# Patient Record
Sex: MALE | Race: WHITE | NOT HISPANIC OR LATINO | Employment: FULL TIME | URBAN - METROPOLITAN AREA
[De-identification: names, ages, dates, MRNs, and addresses within clinical notes are randomized per-mention and may not be internally consistent; named-entity substitution may affect disease eponyms.]

---

## 2022-09-06 ENCOUNTER — HOSPITAL ENCOUNTER (EMERGENCY)
Facility: CLINIC | Age: 47
Discharge: HOME | End: 2022-09-06
Attending: EMERGENCY MEDICINE | Admitting: EMERGENCY MEDICINE
Payer: COMMERCIAL

## 2022-09-06 VITALS
HEART RATE: 77 BPM | RESPIRATION RATE: 25 BRPM | DIASTOLIC BLOOD PRESSURE: 70 MMHG | SYSTOLIC BLOOD PRESSURE: 118 MMHG | BODY MASS INDEX: 20.72 KG/M2 | OXYGEN SATURATION: 98 % | HEIGHT: 71 IN | WEIGHT: 148 LBS

## 2022-09-06 DIAGNOSIS — T78.40XA ALLERGIC REACTION, INITIAL ENCOUNTER: ICD-10-CM

## 2022-09-06 PROCEDURE — 96374 THER/PROPH/DIAG INJ IV PUSH: CPT

## 2022-09-06 PROCEDURE — 250N000009 HC RX 250: Performed by: EMERGENCY MEDICINE

## 2022-09-06 PROCEDURE — 96375 TX/PRO/DX INJ NEW DRUG ADDON: CPT

## 2022-09-06 PROCEDURE — 99284 EMERGENCY DEPT VISIT MOD MDM: CPT | Mod: 25

## 2022-09-06 PROCEDURE — 250N000011 HC RX IP 250 OP 636: Performed by: EMERGENCY MEDICINE

## 2022-09-06 PROCEDURE — 250N000011 HC RX IP 250 OP 636

## 2022-09-06 RX ORDER — METHYLPREDNISOLONE SODIUM SUCCINATE 125 MG/2ML
125 INJECTION, POWDER, LYOPHILIZED, FOR SOLUTION INTRAMUSCULAR; INTRAVENOUS ONCE
Status: COMPLETED | OUTPATIENT
Start: 2022-09-06 | End: 2022-09-06

## 2022-09-06 RX ORDER — LIDOCAINE 40 MG/G
CREAM TOPICAL
Status: DISCONTINUED | OUTPATIENT
Start: 2022-09-06 | End: 2022-09-06 | Stop reason: HOSPADM

## 2022-09-06 RX ORDER — DIPHENHYDRAMINE HYDROCHLORIDE 50 MG/ML
INJECTION INTRAMUSCULAR; INTRAVENOUS
Status: COMPLETED
Start: 2022-09-06 | End: 2022-09-06

## 2022-09-06 RX ORDER — EPINEPHRINE 0.3 MG/.3ML
0.3 INJECTION SUBCUTANEOUS
Qty: 2 EACH | Refills: 0 | Status: SHIPPED | OUTPATIENT
Start: 2022-09-06

## 2022-09-06 RX ADMIN — METHYLPREDNISOLONE SODIUM SUCCINATE 125 MG: 125 INJECTION, POWDER, FOR SOLUTION INTRAMUSCULAR; INTRAVENOUS at 14:49

## 2022-09-06 RX ADMIN — FAMOTIDINE 20 MG: 10 INJECTION INTRAVENOUS at 14:50

## 2022-09-06 RX ADMIN — DIPHENHYDRAMINE HYDROCHLORIDE 25 MG: 50 INJECTION, SOLUTION INTRAMUSCULAR; INTRAVENOUS at 14:46

## 2022-09-06 RX ADMIN — EPINEPHRINE 0.5 MG: 1 INJECTION INTRAMUSCULAR; INTRAVENOUS; SUBCUTANEOUS at 14:49

## 2022-09-06 ASSESSMENT — ACTIVITIES OF DAILY LIVING (ADL): ADLS_ACUITY_SCORE: 35

## 2022-09-06 NOTE — ED PROVIDER NOTES
"  History   Chief Complaint:  Allergic Reaction       HPI   Mohit Phipps is a 47 year old male with history of peanut allergy who presents with an allergic reaction. The patient reports one of his coworkers brought in a snack today that inadvertantly contained peanuts. His coworker insisted that they did not have peanuts in them and so he tried one. He reports immediately feeling like his throat was swelling. He did not administer any epinephrine as he does not carry his epi pen, he is usually very careful about what he eats. He denies any nausea or rash.     Review of Systems  10 point review of systems performed and is negative except as above and in HPI.    Allergies:  Peanut-Derived    Medications:  The patient is currently on no regular medications.    Past Medical History:     Allergic reaction      Social History:  The patient presents to the ED alone    Physical Exam     Patient Vitals for the past 24 hrs:   BP Pulse Resp SpO2 Height Weight   09/06/22 1600 118/70 77 25 98 % -- --   09/06/22 1530 118/71 84 22 99 % -- --   09/06/22 1515 121/70 89 22 99 % -- --   09/06/22 1500 121/87 85 23 99 % -- --   09/06/22 1446 -- -- -- -- 1.803 m (5' 11\") 67.1 kg (148 lb)   09/06/22 1445 131/88 86 14 99 % -- --   09/06/22 1440 122/86 80 11 98 % -- --       Physical Exam  General: Frequent throat clearing. Resting on the gurney, appears uncomfortable  Head:  The scalp, face, and head appear normal  Mouth/Throat: No intraoral swelling. No labial swelling. Mucus membranes are moist  CV:  Regular rate    Normal S1 and S2  No pathological murmur   Resp:  Breath sounds clear and equal bilaterally    Non-labored, no retractions or accessory muscle use    No coarseness    No wheezing   GI:  Abdomen is soft, no rigidity    No tenderness to palpation  MS:  Normal motor assessment of all extremities.    Good capillary refill noted.  Skin:  No urticaria. No rash or lesions noted.  Neuro:   Speech is normal and fluent. No " apparent deficit.  Psych: Awake. Alert.  Normal affect.      Appropriate interactions.    Emergency Department Course   Emergency Department Course:  Reviewed:  I reviewed nursing notes, vitals and past medical history    Assessments:  1438 I obtained history and examined the patient as noted above.   1655 I rechecked the patient and explained findings.     Interventions:  1446 benadryl, 25 mg, IV  1449 epinephrine, 0.5 mg, IM  1449 solu-medrol, 125 mg, IV  1450 Pepcid, 20 mg, IV  1450 sodium chloride, 3 mL, intra catheter     Disposition:  The patient was discharged to home.     Impression & Plan   Medical Decision Making:  Mohit Phipps is a 47 year old male who presents for evaluation of throat swelling. The patient has known peanut allergy and accidentally consumed a food with nuts in it.  This is likely consistent with allergic phenomena. No signs of angioedema, respiratory compromise, shock, serious systemic reaction, etc.  No signs of serious infection, cellulitis, vasculitis, or other skin manifestation of a serious underlying disease.  Supportive outpatient management is indicated and discussed with the patient.  In the ED, the patient received the above interventions with improvement of symptoms.  Close follow up with primary care physician.  The patient was instructed to return with any respiratory symptoms, wheezing, shortness of breath, facial or throat/tongue swelling.        Diagnosis:    ICD-10-CM    1. Allergic reaction, initial encounter  T78.40XA        Discharge Medications:  Discharge Medication List as of 9/6/2022  5:01 PM      START taking these medications    Details   EPINEPHrine (ANY BX GENERIC EQUIV) 0.3 MG/0.3ML injection 2-pack Inject 0.3 mLs (0.3 mg) into the muscle once as needed for anaphylaxis May repeat one time in 5-15 minutes if response to initial dose is inadequate., Disp-2 each, R-0, E-Prescribe             Scribe Disclosure:  Alejandro COLE, am serving as a scribe at  2:40 PM on 9/6/2022 to document services personally performed by Carole Booker MD based on my observations and the provider's statements to me.            Carole Booker MD  09/06/22 5125

## 2022-09-06 NOTE — ED TRIAGE NOTES
Pt ate a snack that contained peanuts/nuts in it, unknown to him. Pt did not take his epi as he didn't have it with him.